# Patient Record
Sex: FEMALE | Race: WHITE
[De-identification: names, ages, dates, MRNs, and addresses within clinical notes are randomized per-mention and may not be internally consistent; named-entity substitution may affect disease eponyms.]

---

## 2021-04-20 ENCOUNTER — HOSPITAL ENCOUNTER (INPATIENT)
Dept: HOSPITAL 8 - ED | Age: 41
LOS: 1 days | Discharge: LEFT BEFORE DISCHARGE | DRG: 445 | End: 2021-04-21
Attending: HOSPITALIST | Admitting: STUDENT IN AN ORGANIZED HEALTH CARE EDUCATION/TRAINING PROGRAM
Payer: COMMERCIAL

## 2021-04-20 VITALS — WEIGHT: 293 LBS | HEIGHT: 68 IN | BODY MASS INDEX: 44.41 KG/M2

## 2021-04-20 DIAGNOSIS — I10: ICD-10-CM

## 2021-04-20 DIAGNOSIS — K80.00: Primary | ICD-10-CM

## 2021-04-20 DIAGNOSIS — E66.01: ICD-10-CM

## 2021-04-20 DIAGNOSIS — R73.9: ICD-10-CM

## 2021-04-20 DIAGNOSIS — F41.9: ICD-10-CM

## 2021-04-20 DIAGNOSIS — Z88.8: ICD-10-CM

## 2021-04-20 DIAGNOSIS — F32.9: ICD-10-CM

## 2021-04-20 DIAGNOSIS — K76.0: ICD-10-CM

## 2021-04-20 DIAGNOSIS — F17.210: ICD-10-CM

## 2021-04-20 LAB
ALBUMIN SERPL-MCNC: 3.1 G/DL (ref 3.4–5)
ALP SERPL-CCNC: 126 U/L (ref 45–117)
ALT SERPL-CCNC: 85 U/L (ref 12–78)
ANION GAP SERPL CALC-SCNC: 8 MMOL/L (ref 5–15)
BASOPHILS # BLD AUTO: 0.2 X10^3/UL (ref 0–0.1)
BASOPHILS NFR BLD AUTO: 1 % (ref 0–1)
BILIRUB SERPL-MCNC: 0.2 MG/DL (ref 0.2–1)
CALCIUM SERPL-MCNC: 8.7 MG/DL (ref 8.5–10.1)
CHLORIDE SERPL-SCNC: 106 MMOL/L (ref 98–107)
CREAT SERPL-MCNC: 0.83 MG/DL (ref 0.55–1.02)
EOSINOPHIL # BLD AUTO: 0.1 X10^3/UL (ref 0–0.4)
EOSINOPHIL NFR BLD AUTO: 1 % (ref 1–7)
ERYTHROCYTE [DISTWIDTH] IN BLOOD BY AUTOMATED COUNT: 16.2 % (ref 9.6–15.2)
LYMPHOCYTES # BLD AUTO: 2.7 X10^3/UL (ref 1–3.4)
LYMPHOCYTES NFR BLD AUTO: 20 % (ref 22–44)
MCH RBC QN AUTO: 28.8 PG (ref 27–34.8)
MCHC RBC AUTO-ENTMCNC: 33.6 G/DL (ref 32.4–35.8)
MD: NO
MONOCYTES # BLD AUTO: 0.8 X10^3/UL (ref 0.2–0.8)
MONOCYTES NFR BLD AUTO: 6 % (ref 2–9)
NEUTROPHILS # BLD AUTO: 10.2 X10^3/UL (ref 1.8–6.8)
NEUTROPHILS NFR BLD AUTO: 73 % (ref 42–75)
PLATELET # BLD AUTO: 293 X10^3/UL (ref 130–400)
PMV BLD AUTO: 8.1 FL (ref 7.4–10.4)
PROT SERPL-MCNC: 7.2 G/DL (ref 6.4–8.2)
RBC # BLD AUTO: 5.5 X10^6/UL (ref 3.82–5.3)

## 2021-04-20 PROCEDURE — 74177 CT ABD & PELVIS W/CONTRAST: CPT

## 2021-04-20 PROCEDURE — 87635 SARS-COV-2 COVID-19 AMP PRB: CPT

## 2021-04-20 PROCEDURE — 85025 COMPLETE CBC W/AUTO DIFF WBC: CPT

## 2021-04-20 PROCEDURE — 76700 US EXAM ABDOM COMPLETE: CPT

## 2021-04-20 PROCEDURE — 96375 TX/PRO/DX INJ NEW DRUG ADDON: CPT

## 2021-04-20 PROCEDURE — 83036 HEMOGLOBIN GLYCOSYLATED A1C: CPT

## 2021-04-20 PROCEDURE — 36415 COLL VENOUS BLD VENIPUNCTURE: CPT

## 2021-04-20 PROCEDURE — 83690 ASSAY OF LIPASE: CPT

## 2021-04-20 PROCEDURE — 84703 CHORIONIC GONADOTROPIN ASSAY: CPT

## 2021-04-20 PROCEDURE — 96374 THER/PROPH/DIAG INJ IV PUSH: CPT

## 2021-04-20 PROCEDURE — 80053 COMPREHEN METABOLIC PANEL: CPT

## 2021-04-20 PROCEDURE — 99285 EMERGENCY DEPT VISIT HI MDM: CPT

## 2021-04-20 NOTE — NUR
PT RESTING ON GUDIANE, NAD, APPEARS COMFORTABLE, STATES 2/10 PAIN AT THIS TIME. 
WAITING FOR CT, NO OTHER CHANGES IN CONDITION,  AT BS, BED IN LOWEST, 
RAILS ENGAGED, CALL LIGHT ON LAP, WCTM.

## 2021-04-20 NOTE — NUR
pt medicated per mar for pain, nad, appears more comfortable at this time, bed 
in lowest, pt sitting at edge,  at bs. us at bs at this time. wctm.

## 2021-04-20 NOTE — NUR
PT CAME INTO ED THIS EVENING DUE TO "PAIN IN MY GALLBLADDER". REPORTS LEFT 
SIDED UPPER ABDOMINAL QUADRANT PAIN FOR "AWHILE NOW". SAW PCP HAIDER GALLAGHER FOR IT 
AND SCHEDULED IMAGING FOR MAY. PT SAYS PAIN BECAME SHARP STABBING AND WORSE 
THIS EVENING AND RADIATING TO BACK LEADING TO HER COMING INTO ED. PT NAD, 
RESTING ON GURNEY, APPEARS COMFORTABLE, PLACED ON SPO2/BP/ECG MONITORING AT 
THIS TIME. PT IS TACHY ON MONITOR. PROVIDED WARM BLANKETS FOR COMFORT, BED IN 
LOWEST, RAILS ENGAGED, CALLED LIGHT ON LAP. WCTM. 

-------------------------------------------------------------------------------

Addendum: 04/20/21 at 2153 by LYLA

-------------------------------------------------------------------------------

right upper abdominal pain, not left

## 2021-04-21 VITALS — SYSTOLIC BLOOD PRESSURE: 112 MMHG | DIASTOLIC BLOOD PRESSURE: 62 MMHG

## 2021-04-21 VITALS — SYSTOLIC BLOOD PRESSURE: 156 MMHG | DIASTOLIC BLOOD PRESSURE: 81 MMHG

## 2021-04-21 VITALS — DIASTOLIC BLOOD PRESSURE: 80 MMHG | SYSTOLIC BLOOD PRESSURE: 132 MMHG

## 2021-04-21 LAB
ALBUMIN SERPL-MCNC: 3.4 G/DL (ref 3.4–5)
ALP SERPL-CCNC: 121 U/L (ref 45–117)
ALT SERPL-CCNC: 82 U/L (ref 12–78)
ANION GAP SERPL CALC-SCNC: 4 MMOL/L (ref 5–15)
BASOPHILS # BLD AUTO: 0.2 X10^3/UL (ref 0–0.1)
BASOPHILS NFR BLD AUTO: 1 % (ref 0–1)
BILIRUB SERPL-MCNC: 0.3 MG/DL (ref 0.2–1)
CALCIUM SERPL-MCNC: 8.6 MG/DL (ref 8.5–10.1)
CHLORIDE SERPL-SCNC: 107 MMOL/L (ref 98–107)
CREAT SERPL-MCNC: 0.85 MG/DL (ref 0.55–1.02)
EOSINOPHIL # BLD AUTO: 0.2 X10^3/UL (ref 0–0.4)
EOSINOPHIL NFR BLD AUTO: 1 % (ref 1–7)
ERYTHROCYTE [DISTWIDTH] IN BLOOD BY AUTOMATED COUNT: 16.5 % (ref 9.6–15.2)
EST. AVERAGE GLUCOSE BLD GHB EST-MCNC: 134 MG/DL (ref 0–126)
LYMPHOCYTES # BLD AUTO: 3.6 X10^3/UL (ref 1–3.4)
LYMPHOCYTES NFR BLD AUTO: 25 % (ref 22–44)
MCH RBC QN AUTO: 28.6 PG (ref 27–34.8)
MCHC RBC AUTO-ENTMCNC: 33.1 G/DL (ref 32.4–35.8)
MD: NO
MONOCYTES # BLD AUTO: 1.2 X10^3/UL (ref 0.2–0.8)
MONOCYTES NFR BLD AUTO: 9 % (ref 2–9)
NEUTROPHILS # BLD AUTO: 9.2 X10^3/UL (ref 1.8–6.8)
NEUTROPHILS NFR BLD AUTO: 64 % (ref 42–75)
PLATELET # BLD AUTO: 306 X10^3/UL (ref 130–400)
PMV BLD AUTO: 8.2 FL (ref 7.4–10.4)
PROT SERPL-MCNC: 7.4 G/DL (ref 6.4–8.2)
RBC # BLD AUTO: 5.5 X10^6/UL (ref 3.82–5.3)

## 2021-04-21 RX ADMIN — MORPHINE SULFATE PRN MG: 10 INJECTION INTRAVENOUS at 06:48

## 2021-04-21 RX ADMIN — MORPHINE SULFATE PRN MG: 10 INJECTION INTRAVENOUS at 02:54

## 2021-04-21 NOTE — NUR
Report given to KATHLEEN Rich. Patient to be transferred to room [facility].

-------------------------------------------------------------------------------

Addendum: 04/21/21 at 0232 by [email or web address]

-------------------------------------------------------------------------------

Report to KATHLEEN Piper.

## 2021-04-21 NOTE — NUR
pt back from ct at this time. nad, no change in condition,  at bs. bed 
in [facility], rails engaged, call light on lap, wctm. waiting for ct read.